# Patient Record
Sex: FEMALE | Race: WHITE | NOT HISPANIC OR LATINO | URBAN - METROPOLITAN AREA
[De-identification: names, ages, dates, MRNs, and addresses within clinical notes are randomized per-mention and may not be internally consistent; named-entity substitution may affect disease eponyms.]

---

## 2017-01-12 ENCOUNTER — AMBULATORY SURGICAL CENTER (OUTPATIENT)
Dept: URBAN - METROPOLITAN AREA SURGERY 2 | Facility: SURGERY | Age: 62
End: 2017-01-12
Payer: COMMERCIAL

## 2017-01-12 VITALS
HEART RATE: 79 BPM | DIASTOLIC BLOOD PRESSURE: 75 MMHG | OXYGEN SATURATION: 98 % | OXYGEN SATURATION: 95 % | OXYGEN SATURATION: 94 % | SYSTOLIC BLOOD PRESSURE: 98 MMHG | HEART RATE: 79 BPM | HEART RATE: 89 BPM | SYSTOLIC BLOOD PRESSURE: 109 MMHG | DIASTOLIC BLOOD PRESSURE: 68 MMHG | SYSTOLIC BLOOD PRESSURE: 98 MMHG | DIASTOLIC BLOOD PRESSURE: 58 MMHG | SYSTOLIC BLOOD PRESSURE: 109 MMHG | WEIGHT: 156 LBS | DIASTOLIC BLOOD PRESSURE: 61 MMHG | DIASTOLIC BLOOD PRESSURE: 61 MMHG | SYSTOLIC BLOOD PRESSURE: 126 MMHG | HEIGHT: 63 IN | TEMPERATURE: 98.1 F | SYSTOLIC BLOOD PRESSURE: 103 MMHG | OXYGEN SATURATION: 98 % | TEMPERATURE: 98.7 F | HEIGHT: 63 IN | TEMPERATURE: 98.1 F | DIASTOLIC BLOOD PRESSURE: 68 MMHG | SYSTOLIC BLOOD PRESSURE: 103 MMHG | HEART RATE: 89 BPM | RESPIRATION RATE: 16 BRPM | OXYGEN SATURATION: 95 % | HEART RATE: 87 BPM | OXYGEN SATURATION: 94 % | DIASTOLIC BLOOD PRESSURE: 58 MMHG | DIASTOLIC BLOOD PRESSURE: 75 MMHG | RESPIRATION RATE: 16 BRPM | HEART RATE: 87 BPM | TEMPERATURE: 98.7 F | WEIGHT: 156 LBS | SYSTOLIC BLOOD PRESSURE: 126 MMHG

## 2017-01-12 DIAGNOSIS — Z12.11 ENCOUNTER FOR SCREENING FOR MALIGNANT NEOPLASM OF COLON: ICD-10-CM

## 2017-01-12 PROCEDURE — G0121 COLON CA SCRN NOT HI RSK IND: HCPCS | Performed by: INTERNAL MEDICINE

## 2018-03-05 ENCOUNTER — OFFICE (OUTPATIENT)
Dept: URBAN - METROPOLITAN AREA CLINIC 19 | Facility: CLINIC | Age: 63
End: 2018-03-05

## 2018-03-05 VITALS
HEIGHT: 63 IN | SYSTOLIC BLOOD PRESSURE: 133 MMHG | WEIGHT: 153 LBS | HEART RATE: 80 BPM | DIASTOLIC BLOOD PRESSURE: 71 MMHG

## 2018-03-05 DIAGNOSIS — E66.3 OVERWEIGHT: ICD-10-CM

## 2018-03-05 DIAGNOSIS — R19.4 CHANGE IN BOWEL HABIT: ICD-10-CM

## 2018-03-05 PROCEDURE — 99213 OFFICE O/P EST LOW 20 MIN: CPT | Performed by: INTERNAL MEDICINE

## 2018-03-05 RX ORDER — METOCLOPRAMIDE 5 MG/1
TABLET ORAL
Qty: 360 | Refills: 3 | Status: ACTIVE

## 2019-03-28 ENCOUNTER — OFFICE (OUTPATIENT)
Dept: URBAN - METROPOLITAN AREA CLINIC 19 | Facility: CLINIC | Age: 64
End: 2019-03-28

## 2019-03-28 VITALS
HEART RATE: 80 BPM | HEIGHT: 63 IN | WEIGHT: 155 LBS | DIASTOLIC BLOOD PRESSURE: 74 MMHG | SYSTOLIC BLOOD PRESSURE: 134 MMHG

## 2019-03-28 DIAGNOSIS — R19.4 CHANGE IN BOWEL HABIT: ICD-10-CM

## 2019-03-28 PROCEDURE — 99213 OFFICE O/P EST LOW 20 MIN: CPT | Performed by: INTERNAL MEDICINE

## 2019-03-28 RX ORDER — METOCLOPRAMIDE 5 MG/1
TABLET ORAL
Qty: 360 | Refills: 3 | Status: ACTIVE

## 2020-07-14 ENCOUNTER — OFFICE (OUTPATIENT)
Dept: URBAN - METROPOLITAN AREA CLINIC 19 | Facility: CLINIC | Age: 65
End: 2020-07-14
Payer: COMMERCIAL

## 2020-07-14 VITALS
SYSTOLIC BLOOD PRESSURE: 163 MMHG | HEIGHT: 62 IN | DIASTOLIC BLOOD PRESSURE: 85 MMHG | HEART RATE: 98 BPM | WEIGHT: 159 LBS

## 2020-07-14 DIAGNOSIS — K21.9 GASTRO-ESOPHAGEAL REFLUX DISEASE WITHOUT ESOPHAGITIS: ICD-10-CM

## 2020-07-14 DIAGNOSIS — R19.4 CHANGE IN BOWEL HABIT: ICD-10-CM

## 2020-07-14 DIAGNOSIS — K31.84 GASTROPARESIS: ICD-10-CM

## 2020-07-14 PROCEDURE — 99214 OFFICE O/P EST MOD 30 MIN: CPT | Performed by: INTERNAL MEDICINE

## 2020-07-14 RX ORDER — METOCLOPRAMIDE 5 MG/1
TABLET ORAL
Qty: 360 | Refills: 3 | Status: ACTIVE

## 2020-07-14 NOTE — SERVICENOTES
The patient's assessment was reviewed with Dr. Dawson and a collaborative plan of care was established.

## 2020-07-14 NOTE — SERVICEHPINOTES
65-year-old white female returns for routine follow-up of her GERD and gastroparesis. She continues to do well off PPI for reflux. She does take an occasional Tums for reflux, and on rare occasions will take a 14 day course of PPI. She was decreased from 10 mg to 5 mg of Reglan QID last year for her idiopathic gastroparesis. This seems to be working well. She takes senna for chronic constipation, which she relates to a side effect of taking Arimidex. She denies dysphagia, abdominal pain, change in bowel habit or overt GI bleeding. She denies any interval history since she was last here 3/28/19.Colonoscopy 1/12/17 was normal and previous EGD/colonoscopy 9/28/06 [address] CT/pelvis 9/7/06 were unremarkable. Nuclear medicine gastric emptying study 2/7/05 found gastroparesis. Her uncle had colon cancer.

## 2021-09-01 ENCOUNTER — TRANSCRIBE ORDER (OUTPATIENT)
Dept: SCHEDULING | Age: 66
End: 2021-09-01

## 2021-09-01 DIAGNOSIS — Z12.31 ENCOUNTER FOR SCREENING MAMMOGRAM FOR MALIGNANT NEOPLASM OF BREAST: Primary | ICD-10-CM

## 2021-09-09 ENCOUNTER — TRANSCRIBE ORDER (OUTPATIENT)
Dept: SCHEDULING | Age: 66
End: 2021-09-09

## 2021-09-09 DIAGNOSIS — Z12.31 VISIT FOR SCREENING MAMMOGRAM: Primary | ICD-10-CM

## 2021-10-05 ENCOUNTER — TRANSCRIBE ORDER (OUTPATIENT)
Dept: SCHEDULING | Age: 66
End: 2021-10-05

## 2021-10-05 DIAGNOSIS — K31.84 GASTROPARESIS: Primary | ICD-10-CM

## 2021-10-14 ENCOUNTER — HOSPITAL ENCOUNTER (OUTPATIENT)
Dept: GENERAL RADIOLOGY | Age: 66
Discharge: HOME | End: 2021-10-14
Attending: NURSE PRACTITIONER
Payer: MEDICARE

## 2021-10-14 DIAGNOSIS — K31.84 GASTROPARESIS: ICD-10-CM

## 2021-10-14 PROCEDURE — 74220 X-RAY XM ESOPHAGUS 1CNTRST: CPT

## 2022-10-05 ENCOUNTER — TRANSCRIBE ORDER (OUTPATIENT)
Dept: SCHEDULING | Age: 67
End: 2022-10-05

## 2022-10-05 DIAGNOSIS — Z13.820 OSTEOPOROSIS SCREENING: Primary | ICD-10-CM

## 2022-10-14 ENCOUNTER — TRANSCRIBE ORDER (OUTPATIENT)
Dept: SCHEDULING | Age: 67
End: 2022-10-14

## 2022-10-14 DIAGNOSIS — N95.1 POSTMENOPAUSAL SYNDROME: Primary | ICD-10-CM

## 2022-11-03 ENCOUNTER — TRANSCRIBE ORDER (OUTPATIENT)
Dept: SCHEDULING | Age: 67
End: 2022-11-03

## 2022-11-03 DIAGNOSIS — Z78.0 ASYMPTOMATIC MENOPAUSAL STATE: Primary | ICD-10-CM

## 2023-01-09 ENCOUNTER — HOSPITAL ENCOUNTER (OUTPATIENT)
Dept: MAMMOGRAPHY | Age: 68
Discharge: HOME | End: 2023-01-09
Attending: NURSE PRACTITIONER
Payer: MEDICARE

## 2023-01-09 DIAGNOSIS — Z78.0 ASYMPTOMATIC MENOPAUSAL STATE: ICD-10-CM

## 2023-01-09 PROCEDURE — 77080 DXA BONE DENSITY AXIAL: CPT
